# Patient Record
Sex: MALE | Race: ASIAN
[De-identification: names, ages, dates, MRNs, and addresses within clinical notes are randomized per-mention and may not be internally consistent; named-entity substitution may affect disease eponyms.]

---

## 2019-04-15 ENCOUNTER — HOSPITAL ENCOUNTER (EMERGENCY)
Dept: HOSPITAL 5 - ED | Age: 59
Discharge: HOME | End: 2019-04-15
Payer: COMMERCIAL

## 2019-04-15 DIAGNOSIS — I83.91: Primary | ICD-10-CM

## 2019-04-15 DIAGNOSIS — I10: ICD-10-CM

## 2019-04-15 PROCEDURE — 99282 EMERGENCY DEPT VISIT SF MDM: CPT

## 2019-04-15 NOTE — EMERGENCY DEPARTMENT REPORT
ED Recheck HPI





- General


Chief Complaint: Extremity Problem,Nontraumatic


Stated Complaint: LEG BLEED


Time Seen by Provider: 04/15/19 02:36


Source: patient


Mode of arrival: Ambulatory


Limitations: No Limitations





- History of Present Illness


Initial Comments: 





She is a 58-year-old male that comes to the ER today with spontaneous bleeding 

from his right leg.  He is on no blood thinners.  Patient is morbidly obese.  It

appears that he had a varicosity that was superficial to rupture.  By the time 

he got to the ER the bleeding had stopped.  Patient hypertensive, obese and not 

on any home medications at present. No trauma. 


MD Complaint: other


-: Sudden





- Related Data


                                    Allergies











Allergy/AdvReac Type Severity Reaction Status Date / Time


 


No Known Allergies Allergy   Unverified 04/15/19 01:31














ED Review of Systems


ROS: 


Stated complaint: LEG BLEED


Other details as noted in HPI





Comment: All other systems reviewed and negative





ED Past Medical Hx





- Past Medical History


Previous Medical History?: Yes


Hx Hypertension: Yes


Additional medical history: Morbid Obesity





- Surgical History


Past Surgical History?: Yes


Additional Surgical History: Hemorrhoidectomy





- Social History


Smoking Status: Never Smoker


Substance Use Type: None





ED Physical Exam





- General


Limitations: No Limitations


General appearance: alert





- Head


Head exam: Present: atraumatic, normocephalic





- Eye


Eye exam: Present: normal appearance, PERRL





- ENT


ENT exam: Present: normal exam, mucous membranes moist





- Neck


Neck exam: Present: normal inspection





- Respiratory


Respiratory exam: Present: normal lung sounds bilaterally





- Cardiovascular


Cardiovascular Exam: Present: regular rate





- GI/Abdominal


GI/Abdominal exam: Present: soft, normal bowel sounds





- Rectal


Rectal exam: Present: deferred





- Extremities Exam


Extremities exam: Present: normal inspection, full ROM





- Back Exam


Back exam: Present: normal inspection, full ROM





- Neurological Exam


Neurological exam: Present: alert, oriented X3





- Psychiatric


Psychiatric exam: Present: normal affect, normal mood





- Skin


Skin exam: Present: warm, dry





ED Course


                                   Vital Signs











  04/15/19 04/15/19 04/15/19





  01:33 02:20 03:12


 


Temperature 98.4 F 98.4 F 


 


Pulse Rate 62 62 60


 


Respiratory 18 18 





Rate   


 


Blood Pressure 179/108 179/108 158/97


 


O2 Sat by Pulse 97 97 





Oximetry   














ED Recheck MDM





- Core Measures


Measure Exclusions: not indicated





- Medical Decision Making





rle just above knee pt had ruptured varicies


obese


htn- off meds





no cp


no sob


no headache





neuro intact without focal deficit.





wound care and dressing applied


educated on varicies





medicated for bp


will dc home with pcp follow up








Critical care attestation.: 


If time is entered above; I have spent that time in minutes in the direct care 

of this critically ill patient, excluding procedure time.








ED Disposition


Clinical Impression: 


 Varicosities of leg, Bleeding from varicose veins of lower extremity, Elevated 

blood pressure reading





Disposition: DC-01 TO HOME OR SELFCARE


Is pt being admited?: No


Does the pt Need Aspirin: No


Condition: Stable


Instructions:  Varicose Veins (ED), DASH Eating Plan (ED), Low Sodium Diet (ED)


Additional Instructions: 


WHEN THIS HAPPENS ELEVATE LEG, APPLY PRESSURE AND ICE TO THE AREA





FOLLOW UP WITH PCP


REFERRAL BELOW





DIET AND ACTIVITY AS TOLERATED





MONITOR YOUR BP 


IT WAS HIGH TONIGHT


LOW FAT AND LOW SALT DIET


HYDRATE WELL WITH WATER








Referrals: 


[facility] [Outside] - 3-5 Days


Time of Disposition: 02:43

## 2019-04-16 VITALS — DIASTOLIC BLOOD PRESSURE: 97 MMHG | SYSTOLIC BLOOD PRESSURE: 158 MMHG
